# Patient Record
Sex: FEMALE | URBAN - METROPOLITAN AREA
[De-identification: names, ages, dates, MRNs, and addresses within clinical notes are randomized per-mention and may not be internally consistent; named-entity substitution may affect disease eponyms.]

---

## 2018-12-14 ENCOUNTER — OFFICE VISIT (OUTPATIENT)
Dept: INTERNAL MEDICINE CLINIC | Age: 28
End: 2018-12-14

## 2018-12-14 VITALS
HEART RATE: 61 BPM | BODY MASS INDEX: 26.6 KG/M2 | OXYGEN SATURATION: 97 % | WEIGHT: 155.8 LBS | DIASTOLIC BLOOD PRESSURE: 68 MMHG | RESPIRATION RATE: 16 BRPM | TEMPERATURE: 96 F | SYSTOLIC BLOOD PRESSURE: 110 MMHG | HEIGHT: 64 IN

## 2018-12-14 DIAGNOSIS — Z13.220 SCREENING, LIPID: ICD-10-CM

## 2018-12-14 DIAGNOSIS — Z83.49 FAMILY HISTORY OF HYPOTHYROIDISM: Primary | ICD-10-CM

## 2018-12-14 DIAGNOSIS — R53.83 FATIGUE, UNSPECIFIED TYPE: ICD-10-CM

## 2018-12-14 DIAGNOSIS — Z86.59 HISTORY OF ANXIETY: ICD-10-CM

## 2018-12-14 PROBLEM — F41.9 ANXIETY: Status: ACTIVE | Noted: 2018-12-14

## 2018-12-14 PROBLEM — G43.909 MIGRAINE SYNDROME: Status: ACTIVE | Noted: 2018-12-14

## 2018-12-14 NOTE — PATIENT INSTRUCTIONS

## 2018-12-14 NOTE — PROGRESS NOTES
Fabián Lares is a 28 y.o. female    Chief Complaint   Patient presents with   [address]     new patient. 1. Have you been to the ER, urgent care clinic since your last visit? Hospitalized since your last visit? No    2. Have you seen or consulted any other health care providers outside of the [facility] since your last visit? Include any pap smears or colon screening. No     Visit Vitals  /68 (BP 1 Location: Left arm, BP Patient Position: Sitting)   Pulse 61   Temp 96 °F (35.6 °C)   Resp 16   Ht 5' 4\" (1.626 m)   Wt 155 lb 12.8 oz (70.7 kg)   SpO2 97%   BMI 26.74 kg/m²       Health Maintenance Due   Topic Date Due    DTaP/Tdap/Td series (1 - Tdap) 12/27/2010    PAP AKA CERVICAL CYTOLOGY  12/27/2010    Influenza Age 9 to Adult  08/01/2018     Declines influenza vaccine.       Stephanie Trevino LPN

## 2018-12-14 NOTE — PROGRESS NOTES
Subjective: Muriel Young is a 28 y.o. 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 Worked when she took them, but stopped because didn't want to take medications  Doing well without medications    Had palpitations occasionally months ago, last occurred in august when running outside. Has not occurred since  No chest pain or palpitaitons    Enjoys running, working out with a  at the [employer]. Gets a massage and nutritionist as well  Diet: balanced, making better choices, more protein and vegetables, trying to decrease diet coke    Sleeps well  Is always exhausted  Wondering if is from new job    Health maintenance:   Last pap:  ~ 3 years ago. Has never been sexually active. Was normal.  No birth control. Menstrual cycles normal  Last tetanus vaccination  UTD  Last influenza vaccination  UTD    Family History:   Father: throat cancer, was a smoker, DDD  Mother: hypothyroidism  MGM: lung cancer  Younger sister: healthy    No family history of breast or colon cancer  No family of heart disease    Patient Active Problem List    Diagnosis Date Noted    Anxiety 12/14/2018    Migraine syndrome 12/14/2018       Review of Systems    Pertinent items are noted in HPI.      Objective:     Visit Vitals  /68 (BP 1 Location: Left arm, BP Patient Position: Sitting)   Pulse 61   Temp 96 °F (35.6 °C)   Resp 16   Ht 5' 4\" (1.626 m)   Wt 155 lb 12.8 oz (70.7 kg)   LMP 12/03/2018 (Approximate)   SpO2 97%   BMI 26.74 kg/m²     General appearance: alert, cooperative, no distress, appears stated age  Head: Normocephalic, without obvious abnormality, atraumatic  Neck: supple, symmetrical, trachea midline, no adenopathy, thyroid: not enlarged, symmetric, no tenderness/mass/nodules  Lungs: clear to auscultation bilaterally  Heart: regular rate and rhythm, S1, S2 normal, no murmur, click, rub or gallop  Extremities: extremities normal, atraumatic, no cyanosis or edema  Skin: Skin color, texture, turgor normal  Neurologic: Grossly normal    Assessment/Plan:     1. Family history of hypothyroidism  - TSH 3RD GENERATION    2. History of anxiety  - no meds  - cont healthy diet and regular exercise  - TSH 3RD GENERATION  - METABOLIC PANEL, COMPREHENSIVE  - CBC WITH AUTOMATED DIFF    3. Screening, lipid  - as above  - LIPID PANEL    4. Fatigue, unspecified type  - as above  - VITAMIN D, 25 HYDROXY      Advised her to call back or return to office if symptoms worsen/change/persist.  Discussed expected course/resolution/complications of diagnosis in detail with patient. Medication risks/benefits/costs/interactions/alternatives discussed with patient. She was given an after visit summary which includes diagnoses, current medications, & vitals. She expressed understanding with the diagnosis and plan.     ARNEL Rogers

## 2018-12-15 LAB
25(OH)D3+25(OH)D2 SERPL-MCNC: 35.3 NG/ML (ref 30–100)
ALBUMIN SERPL-MCNC: 4.5 G/DL (ref 3.5–5.5)
ALBUMIN/GLOB SERPL: 1.7 {RATIO} (ref 1.2–2.2)
ALP SERPL-CCNC: 57 IU/L (ref 39–117)
ALT SERPL-CCNC: 36 IU/L (ref 0–32)
AST SERPL-CCNC: 24 IU/L (ref 0–40)
BASOPHILS # BLD AUTO: 0 X10E3/UL (ref 0–0.2)
BASOPHILS NFR BLD AUTO: 0 %
BILIRUB SERPL-MCNC: 0.8 MG/DL (ref 0–1.2)
BUN SERPL-MCNC: 14 MG/DL (ref 6–20)
BUN/CREAT SERPL: 23 (ref 9–23)
CALCIUM SERPL-MCNC: 9.2 MG/DL (ref 8.7–10.2)
CHLORIDE SERPL-SCNC: 103 MMOL/L (ref 96–106)
CHOLEST SERPL-MCNC: 123 MG/DL (ref 100–199)
CO2 SERPL-SCNC: 23 MMOL/L (ref 20–29)
CREAT SERPL-MCNC: 0.61 MG/DL (ref 0.57–1)
EOSINOPHIL # BLD AUTO: 0 X10E3/UL (ref 0–0.4)
EOSINOPHIL NFR BLD AUTO: 1 %
ERYTHROCYTE [DISTWIDTH] IN BLOOD BY AUTOMATED COUNT: 12.8 % (ref 12.3–15.4)
GLOBULIN SER CALC-MCNC: 2.7 G/DL (ref 1.5–4.5)
GLUCOSE SERPL-MCNC: 77 MG/DL (ref 65–99)
HCT VFR BLD AUTO: 39.2 % (ref 34–46.6)
HDLC SERPL-MCNC: 66 MG/DL
HGB BLD-MCNC: 13.1 G/DL (ref 11.1–15.9)
IMM GRANULOCYTES # BLD: 0 X10E3/UL (ref 0–0.1)
IMM GRANULOCYTES NFR BLD: 0 %
INTERPRETATION, 910389: NORMAL
LDLC SERPL CALC-MCNC: 46 MG/DL (ref 0–99)
LYMPHOCYTES # BLD AUTO: 1.9 X10E3/UL (ref 0.7–3.1)
LYMPHOCYTES NFR BLD AUTO: 24 %
MCH RBC QN AUTO: 31.4 PG (ref 26.6–33)
MCHC RBC AUTO-ENTMCNC: 33.4 G/DL (ref 31.5–35.7)
MCV RBC AUTO: 94 FL (ref 79–97)
MONOCYTES # BLD AUTO: 0.6 X10E3/UL (ref 0.1–0.9)
MONOCYTES NFR BLD AUTO: 8 %
NEUTROPHILS # BLD AUTO: 5.4 X10E3/UL (ref 1.4–7)
NEUTROPHILS NFR BLD AUTO: 67 %
PLATELET # BLD AUTO: 203 X10E3/UL (ref 150–379)
POTASSIUM SERPL-SCNC: 3.9 MMOL/L (ref 3.5–5.2)
PROT SERPL-MCNC: 7.2 G/DL (ref 6–8.5)
RBC # BLD AUTO: 4.17 X10E6/UL (ref 3.77–5.28)
SODIUM SERPL-SCNC: 141 MMOL/L (ref 134–144)
TRIGL SERPL-MCNC: 54 MG/DL (ref 0–149)
TSH SERPL DL<=0.005 MIU/L-ACNC: 1.31 UIU/ML (ref 0.45–4.5)
VLDLC SERPL CALC-MCNC: 11 MG/DL (ref 5–40)
WBC # BLD AUTO: 7.9 X10E3/UL (ref 3.4–10.8)

## 2019-02-07 ENCOUNTER — DOCUMENTATION ONLY (OUTPATIENT)
Dept: INTERNAL MEDICINE CLINIC | Age: 29
End: 2019-02-07

## 2019-02-07 NOTE — PROGRESS NOTES
Pt called the office stating that she wanted to be seen for appt because she thought she might have strep throat. We did not have availability  Today I advised her [facility]. [facility] said that [facility].